# Patient Record
Sex: FEMALE | Race: BLACK OR AFRICAN AMERICAN | NOT HISPANIC OR LATINO | Employment: UNEMPLOYED | URBAN - METROPOLITAN AREA
[De-identification: names, ages, dates, MRNs, and addresses within clinical notes are randomized per-mention and may not be internally consistent; named-entity substitution may affect disease eponyms.]

---

## 2017-02-13 ENCOUNTER — HOSPITAL ENCOUNTER (OUTPATIENT)
Dept: RADIOLOGY | Facility: HOSPITAL | Age: 37
Discharge: HOME | End: 2017-02-13
Attending: OPHTHALMOLOGY
Payer: MEDICAID

## 2017-02-13 DIAGNOSIS — H53.469: ICD-10-CM

## 2017-02-13 PROCEDURE — 70553 MRI BRAIN STEM W/O & W/DYE: CPT | Mod: TC

## 2017-02-13 PROCEDURE — 25500020 PHARM REV CODE 255

## 2017-02-13 PROCEDURE — A9585 GADOBUTROL INJECTION: HCPCS

## 2017-02-13 PROCEDURE — 70553 MRI BRAIN STEM W/O & W/DYE: CPT | Mod: 26,,, | Performed by: RADIOLOGY

## 2017-02-13 RX ORDER — GADOBUTROL 604.72 MG/ML
8.5 INJECTION INTRAVENOUS
Status: COMPLETED | OUTPATIENT
Start: 2017-02-13 | End: 2017-02-13

## 2017-02-13 RX ADMIN — GADOBUTROL 8.5 ML: 604.72 INJECTION INTRAVENOUS at 04:02

## 2017-05-27 PROCEDURE — 99284 EMERGENCY DEPT VISIT MOD MDM: CPT

## 2017-05-28 ENCOUNTER — HOSPITAL ENCOUNTER (EMERGENCY)
Facility: HOSPITAL | Age: 37
Discharge: HOME | End: 2017-05-28
Attending: EMERGENCY MEDICINE
Payer: MEDICAID

## 2017-05-28 VITALS
BODY MASS INDEX: 32.27 KG/M2 | HEART RATE: 90 BPM | SYSTOLIC BLOOD PRESSURE: 128 MMHG | DIASTOLIC BLOOD PRESSURE: 74 MMHG | OXYGEN SATURATION: 100 % | WEIGHT: 189 LBS | HEIGHT: 64 IN | TEMPERATURE: 98 F | RESPIRATION RATE: 14 BRPM

## 2017-05-28 DIAGNOSIS — Z77.098 ACCIDENTAL EXPOSURE TO BLEACH: Primary | ICD-10-CM

## 2017-05-28 DIAGNOSIS — H10.211 CHEMICAL CONJUNCTIVITIS OF RIGHT EYE: ICD-10-CM

## 2017-05-28 PROCEDURE — 25000003 PHARM REV CODE 250: Performed by: EMERGENCY MEDICINE

## 2017-05-28 RX ORDER — TETRACAINE HYDROCHLORIDE 5 MG/ML
2 SOLUTION OPHTHALMIC
Status: COMPLETED | OUTPATIENT
Start: 2017-05-28 | End: 2017-05-28

## 2017-05-28 RX ADMIN — TETRACAINE HYDROCHLORIDE 2 DROP: 5 SOLUTION OPHTHALMIC at 12:05

## 2017-05-28 NOTE — ED TRIAGE NOTES
Pt presents to ER due to bleach in right eye. Pt states she was unscrewing a bleach bottle when it spilled into her right eye.Pt complains of R eye pain.Pt states she irrigated eye for 20 mins and EMS irrigated eye for 10.

## 2017-05-28 NOTE — ED PROVIDER NOTES
Encounter Date: 5/27/2017    SCRIBE #1 NOTE: I, Venkatesh Benoit, am scribing for, and in the presence of, Lenard King MD. Other sections scribed: HPI, ROS.       History     Chief Complaint   Patient presents with    Eye Pain     Pt presents to ER due to bleach in R eye. EMS states pt irrigated eye for 20 mins and EMS states they irrigated eye for 10 mins.      Review of patient's allergies indicates:  No Known Allergies  CC: Chemical Exposure, Eye Pain  HPI: This 36 y.o. female presents to the ED via EMS c/o severe R eye pain with associated redness and irritation s/p bleach splashing into her eye at home this evening. Pt explains that she was grasping a squirt bottle of bleach to pick it up when the top came off, causing droplets of bleach to fly into her eye. She reports washing her eye out at home with limited relief. She denies loss of vision in eye; she has no other complaints.      The history is provided by the patient.     Past Medical History:   Diagnosis Date    Normal vaginal delivery     x4     History reviewed. No pertinent surgical history.  History reviewed. No pertinent family history.  Social History   Substance Use Topics    Smoking status: Never Smoker    Smokeless tobacco: Never Used    Alcohol use No     Review of Systems   Constitutional: Negative for fever.   HENT: Negative for rhinorrhea and sore throat.    Eyes: Positive for pain (R) and redness (R).   Respiratory: Negative for cough and shortness of breath.    Cardiovascular: Negative for chest pain.   Skin: Negative for rash and wound.   Neurological: Negative for headaches.   All other systems reviewed and are negative.      Physical Exam     Initial Vitals [05/28/17 0005]   BP Pulse Resp Temp SpO2   132/80 110 14 98 °F (36.7 °C) 100 %     Physical Exam  Nursing note and vitals reviewed.  Constitutional: Nontoxic female who appears uncomfortable.  HENT:    Head: NC/AT    Eyes:     General appearance:  normal-appearing upper and  lower lids.          Conjunctivae:  Moderate chemosis noted to the right eye w/o discharge/exudate.  Conjunctiva appears injected.  (-) scleral icterus.     Pupils:  PERRL.      Visual fields:  Intact    Visual acuity:  See nursing communications             Mouth/Throat: MMM   Neck: Neck supple, normal rom.  Neurological: A&O x4.  No acute focal motor deficits.  Normal gait.  Skin: Skin is intact, warm and dry.  No rash.  Psychiatric: normal mood and affect.      ED Course   Procedures  Labs Reviewed - No data to display          Medical Decision Making:   History:   I obtained history from: someone other than patient.  Old Medical Records: I decided to obtain old medical records.  Old Records Summarized: other records.  Clinical Tests:   Lab Tests: Ordered and Reviewed    Differential Diagnosis:   Differential diagnosis includes but is not limited to... Conjunctivitis, corneal abrasion vs ulcer     Additional Medical Decision Makin-year-old female with no significant past medical history presents the emergency department complaining of right eye pain and redness s/p accidental bleach exposure - see hpi for additional details.  On exam, she has an injected right conjunctiva along with moderate chemosis.  Initial pH 9.   Eye irrigation (NS) initiated w/ Klever lens. Dispo pending...    2:20 AM - after having been irrigated with 2 L normal saline, repeat pH 7.5.  The remainder of her eye exam is unremarkable including negative fluorescein stain.  Findings at this time, most consistent with chemical conjunctivitis.  Recommend close follow-up with ophthalmology within 3-5 days for reevaluation further management.  Return instructions discussed prior to discharge.              Scribe Attestation:   Scribe #1: I performed the above scribed service and the documentation accurately describes the services I performed. I attest to the accuracy of the note.    Attending Attestation:           Physician Attestation for  Scribe:  Physician Attestation Statement for Scribe #1: I, Lenard King MD, reviewed documentation, as scribed by Venkatesh Laboy in my presence, and it is both accurate and complete.                 ED Course     Clinical Impression:   The primary encounter diagnosis was Accidental exposure to bleach. A diagnosis of Chemical conjunctivitis of right eye was also pertinent to this visit.    Disposition:   Disposition: Discharged       Lenard King MD  05/28/17 0323

## 2021-12-18 ENCOUNTER — HOSPITAL ENCOUNTER (EMERGENCY)
Facility: HOSPITAL | Age: 41
Discharge: HOME | End: 2021-12-18
Attending: EMERGENCY MEDICINE
Payer: MEDICAID

## 2021-12-18 VITALS
DIASTOLIC BLOOD PRESSURE: 82 MMHG | OXYGEN SATURATION: 100 % | HEIGHT: 64 IN | WEIGHT: 185 LBS | TEMPERATURE: 98 F | RESPIRATION RATE: 18 BRPM | BODY MASS INDEX: 31.58 KG/M2 | SYSTOLIC BLOOD PRESSURE: 142 MMHG | HEART RATE: 72 BPM

## 2021-12-18 DIAGNOSIS — Z71.89 EDUCATED ABOUT COVID-19 VIRUS INFECTION: Primary | ICD-10-CM

## 2021-12-18 LAB
B-HCG UR QL: NEGATIVE
CTP QC/QA: YES
POC MOLECULAR INFLUENZA A AGN: NEGATIVE
POC MOLECULAR INFLUENZA B AGN: NEGATIVE
SARS-COV-2 RDRP RESP QL NAA+PROBE: NEGATIVE

## 2021-12-18 PROCEDURE — 99282 EMERGENCY DEPT VISIT SF MDM: CPT | Mod: 25

## 2021-12-18 PROCEDURE — U0002 COVID-19 LAB TEST NON-CDC: HCPCS | Performed by: EMERGENCY MEDICINE

## 2021-12-18 PROCEDURE — 87502 INFLUENZA DNA AMP PROBE: CPT

## 2021-12-18 PROCEDURE — 81025 URINE PREGNANCY TEST: CPT | Performed by: EMERGENCY MEDICINE

## 2021-12-18 RX ORDER — ONDANSETRON 8 MG/1
8 TABLET, ORALLY DISINTEGRATING ORAL
Status: DISCONTINUED | OUTPATIENT
Start: 2021-12-18 | End: 2021-12-18

## 2022-08-24 ENCOUNTER — HOSPITAL ENCOUNTER (EMERGENCY)
Facility: HOSPITAL | Age: 42
Discharge: HOME | End: 2022-08-24
Attending: EMERGENCY MEDICINE
Payer: MEDICAID

## 2022-08-24 VITALS
TEMPERATURE: 98 F | WEIGHT: 175 LBS | OXYGEN SATURATION: 100 % | RESPIRATION RATE: 16 BRPM | BODY MASS INDEX: 29.88 KG/M2 | DIASTOLIC BLOOD PRESSURE: 69 MMHG | HEART RATE: 84 BPM | SYSTOLIC BLOOD PRESSURE: 125 MMHG | HEIGHT: 64 IN

## 2022-08-24 DIAGNOSIS — M25.511 ACUTE PAIN OF RIGHT SHOULDER: Primary | ICD-10-CM
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
## 2022-08-24 LAB
B-HCG UR QL: NEGATIVE
CTP QC/QA: YES

## 2022-08-24 PROCEDURE — 25000003 PHARM REV CODE 250: Performed by: PHYSICIAN ASSISTANT

## 2022-08-24 PROCEDURE — 81025 URINE PREGNANCY TEST: CPT | Performed by: EMERGENCY MEDICINE

## 2022-08-24 PROCEDURE — 99284 EMERGENCY DEPT VISIT MOD MDM: CPT

## 2022-08-24 RX ORDER — IBUPROFEN 600 MG/1
600 TABLET ORAL EVERY 6 HOURS PRN
Qty: 20 TABLET | Refills: 0 | Status: SHIPPED | OUTPATIENT
Start: 2022-08-24

## 2022-08-24 RX ORDER — NAPROXEN 500 MG/1
500 TABLET ORAL
Status: COMPLETED | OUTPATIENT
Start: 2022-08-24 | End: 2022-08-24

## 2022-08-24 RX ORDER — LIDOCAINE 50 MG/G
1 PATCH TOPICAL DAILY
Qty: 15 PATCH | Refills: 0 | Status: SHIPPED | OUTPATIENT
Start: 2022-08-24

## 2022-08-24 RX ADMIN — NAPROXEN 500 MG: 500 TABLET ORAL at 02:08

## 2022-08-24 NOTE — ED PROVIDER NOTES
"Encounter Date: 8/24/2022    SCRIBE #1 NOTE: I, Marilynn Bhagat, am scribing for, and in the presence of,  Alayna Holdsworth, PA. I have scribed the following portions of the note - Other sections scribed: HPI, SANDI.       History     Chief Complaint   Patient presents with    Fall     Pt c/o right shoulder pain s/p slip and  ground level fall 2 days ago. Denies hitting head or loc     Chase Correa is a 42 y.o. female, with no pertinent PMHx of who presents to the ED with fall onset 3 days ago. Patient reports she fell off of a 1 foot ramp while at work and she injured her right shoulder. Patient states the shoulder pain is intermittent and "throbbing" with a severity rate of 5/10. She notes the pain is exacerbated with lifting and when she lays on it. No other exacerbating or alleviating factors. Denies head injury, syncope, fever, diaphoresis, chills, shortness of breath, chest pain, nausea, vomiting, headache, difficulty urinating, diarrhea, constipation, dizziness, numbness, tingling, or other associated symptoms.     The history is provided by the patient.     Review of patient's allergies indicates:  No Known Allergies  Past Medical History:   Diagnosis Date    Normal vaginal delivery     x4     Past Surgical History:   Procedure Laterality Date    EYE SURGERY       History reviewed. No pertinent family history.  Social History     Tobacco Use    Smoking status: Never Smoker    Smokeless tobacco: Never Used   Substance Use Topics    Alcohol use: No    Drug use: No     Review of Systems   Constitutional: Negative for chills, diaphoresis and fever.        Positive for fall. Negative for head injury.    Eyes: Negative for visual disturbance.   Respiratory: Negative for shortness of breath.    Cardiovascular: Negative for chest pain.   Gastrointestinal: Negative for constipation, diarrhea, nausea and vomiting.   Genitourinary: Negative for decreased urine volume, difficulty urinating, dysuria, frequency " and urgency.   Musculoskeletal: Positive for arthralgias (Right shoulder pain). Negative for back pain and neck pain.   Skin: Negative for rash and wound.   Neurological: Negative for dizziness, syncope, numbness and headaches.        Negative for tingling.        Physical Exam     Initial Vitals [08/24/22 1355]   BP Pulse Resp Temp SpO2   125/69 84 16 98.3 °F (36.8 °C) 100 %      MAP       --         Physical Exam    Nursing note and vitals reviewed.  Constitutional: Vital signs are normal. She appears well-developed and well-nourished. She is not diaphoretic. She is active. She does not appear ill. No distress.   HENT:   Head: Normocephalic and atraumatic.   Right Ear: External ear normal.   Left Ear: External ear normal.   Nose: Nose normal.   Eyes: Conjunctivae, EOM and lids are normal. Pupils are equal, round, and reactive to light. Right eye exhibits no discharge. Left eye exhibits no discharge.   Neck: Neck supple.   Normal range of motion.   Full passive range of motion without pain.     Cardiovascular: Normal rate and regular rhythm.   Pulses:       Radial pulses are 2+ on the right side and 2+ on the left side.   Pulmonary/Chest: Effort normal and breath sounds normal. No respiratory distress.   Abdominal: She exhibits no distension.   Musculoskeletal:         General: Normal range of motion.      Right shoulder: Bony tenderness (anterior) present. No swelling, deformity, effusion or laceration. Normal range of motion. Normal strength.      Left shoulder: Normal.      Right upper arm: Normal.      Left upper arm: Normal.      Right elbow: Normal.      Left elbow: Normal.      Right forearm: Normal.      Left forearm: Normal.      Right wrist: Normal.      Left wrist: Normal.      Left hand: Normal.      Cervical back: Full passive range of motion without pain, normal range of motion and neck supple. No spinous process tenderness.      Comments: Painful ROM of right shoulder. 2+ radial pulses. Normal  sensation and strength. No deformities noted.      Neurological: She is alert and oriented to person, place, and time. She has normal strength. No sensory deficit. GCS eye subscore is 4. GCS verbal subscore is 5. GCS motor subscore is 6.   Skin: Skin is dry. Capillary refill takes less than 2 seconds.         ED Course   Procedures  Labs Reviewed   POCT URINE PREGNANCY          Imaging Results          X-Ray Shoulder Trauma Right (Final result)  Result time 08/24/22 15:21:02    Final result by Jamari Escobar MD (08/24/22 15:21:02)                 Impression:      No acute displaced fracture-dislocation identified.      Electronically signed by: Jamari Escobar MD  Date:    08/24/2022  Time:    15:21             Narrative:    EXAMINATION:  XR SHOULDER TRAUMA 3 VIEW RIGHT    CLINICAL HISTORY:  Unspecified fall, initial encounter    TECHNIQUE:  Three or four views of the right shoulder were performed.    COMPARISON:  Right shoulder series 08/03/2009    FINDINGS:  Bones are well mineralized. Overall alignment is within normal limits. No displaced fracture, dislocation or destructive osseous process. Joint spaces appear relatively maintained. No subcutaneous emphysema or radiodense retained foreign body.  Right lung apex is clear.                                 Medications   naproxen tablet 500 mg (500 mg Oral Given 8/24/22 1434)     Medical Decision Making:   History:   Old Medical Records: I decided to obtain old medical records.  Initial Assessment:   42 y.o. female, with no pertinent PMHx of who presents to the ED with fall.  Patient's chart and medical history reviewed.  Differential Diagnosis:   Shoulder fracture  Shoulder dislocation  Shoulder sprain  Shoulder contusion  Clinical Tests:   Radiological Study: Ordered and Reviewed  ED Management:  Patient's vitals reviewed.  She is afebrile, no respiratory distress, nontoxic-appearing in the ED. Patient had TTP of right anterior shoulder and painful ROM of right  shoulder. 2+ radial pulses. Normal sensation and strength. No deformities noted.  Patient given naproxen for pain.  X-ray showed no fractures or dislocations.  Discussed with patient this is most likely bone contusion from the fall which will take time to heal. Instructed patient to rest, ice, elevate, and use ibuprofen and tylenol as needed for pain.  Patient will be sent home with lidocaine patches and Motrin for symptomatic control. Discussed with her that she must leave patch on for 12 hours then leave off for 12 hours, she verbalized understanding. Patient agrees with this plan. Discussed with her strict return precautions, she verbalized understanding. Patient is stable for discharge.           Scribe Attestation:   Scribe #1: I performed the above scribed service and the documentation accurately describes the services I performed. I attest to the accuracy of the note.                 Clinical Impression:   Final diagnoses:  [W19.XXXA] Fall  [M25.511] Acute pain of right shoulder (Primary)       I, Alayna Holdsworth,PA-C, personally performed the services described in this documentation. All medical record entries made by the scribe were at my direction and in my presence. I have reviewed the chart and agree that the record reflects my personal performance and is accurate and complete.   ED Disposition Condition    Discharge Stable        ED Prescriptions     Medication Sig Dispense Start Date End Date Auth. Provider    ibuprofen (ADVIL,MOTRIN) 600 MG tablet Take 1 tablet (600 mg total) by mouth every 6 (six) hours as needed for Pain. 20 tablet 8/24/2022  Alayna Holdsworth, PA-C    LIDOcaine (LIDODERM) 5 % Place 1 patch onto the skin once daily. Remove & Discard patch within 12 hours then leave off for 12 hours 15 patch 8/24/2022  Alayna Holdsworth, PA-C        Follow-up Information     Follow up With Specialties Details Why Contact Info    Pineda Osborn MD Family Medicine   [address] [facility] [address] [facility]  [facility] [address]  [phone]             Alayna Holdsworth, PA-C  08/24/22 4594

## 2022-08-24 NOTE — FIRST PROVIDER EVALUATION
Emergency Department TeleTriage Encounter Note      CHIEF COMPLAINT    Chief Complaint   Patient presents with    Fall     Pt c/o right shoulder pain s/p slip and  ground level fall 2 days ago. Denies hitting head or loc       VITAL SIGNS   Initial Vitals [08/24/22 1355]   BP Pulse Resp Temp SpO2   125/69 84 16 98.3 °F (36.8 °C) 100 %      MAP       --            ALLERGIES    Review of patient's allergies indicates:  No Known Allergies    PROVIDER TRIAGE NOTE  This is a teletriage evaluation of a 42 y.o. female presenting to the ED with c/o right shoulder pain after slip and fall two days ago, no head trauma or other MSK pain. Not improved with excedrin at home.     PE: limited ROM R shoulder. Non-toxic/well-appearing. No respiratory distress, speaks in full sentences without issue. No active emesis nor cough. Normal eye contact and mentation.     Plan: XR, meds. Further/augmented workup at discretion of examining provider.     All ED beds are full at present; patient notified of this status.  Patient seen and medically screened by MARCELA via teletriage. Orders initiated at triage to expedite care.  Patient is stable and will be placed in an ED bed when available.  Care will be transferred to an alternate provider when patient has been placed in an Exam Room further exam, additional orders, and disposition.         ORDERS  Labs Reviewed   POCT URINE PREGNANCY       ED Orders (720h ago, onward)    Start Ordered     Status Ordering Provider    08/24/22 1415 08/24/22 1414  naproxen tablet 500 mg  ED 1 Time         Ordered SANKET MEZA    08/24/22 1414 08/24/22 1414  X-Ray Shoulder Trauma Right  1 time imaging         Ordered SANKET MEZA    08/24/22 1358 08/24/22 1357  POCT urine pregnancy  Once         Ordered DERRICK OTERO            Virtual Visit Note: The provider triage portion of this emergency department evaluation and documentation was performed via [employer], a HIPAA-compliant telemedicine  application, in concert with a tele-presenter in the room. A face to face patient evaluation with one of my colleagues will occur once the patient is placed in an emergency department room.      DISCLAIMER: This note was prepared with [employer] voice recognition transcription software. Garbled syntax, mangled pronouns, and other bizarre constructions may be attributed to that software system.

## 2022-08-24 NOTE — Clinical Note
"Chase Hernandez" Sunny was seen and treated in our emergency department on 8/24/2022.  She may return with limitations on 08/25/2022.  No heavy lifting for the next week.     Sincerely,      Alayna Holdsworth, PA-C    "

## 2022-08-24 NOTE — DISCHARGE INSTRUCTIONS
Thank you for coming to our Emergency Department today. It is important to remember that some problems are difficult to diagnose and may not be found during your first visit. Be sure to follow up with your primary care doctor and review any labs/imaging that was performed with them. If you do not have a primary care doctor, you may contact the one listed on your discharge paperwork or you may also call the [facility] Appointment Desk at [phone] to schedule an appointment with one.     All medications may potentially have side effects and it is impossible to predict which medications may give you side effects. If you feel that you are having a negative effect of any medication you should immediately stop taking them and seek medical attention.    Return to the ER with any questions/concerns, new/concerning symptoms, worsening or failure to improve. Do not drive or make any important decisions for 24 hours if you have received any pain medications, sedatives or mood altering drugs during your ER visit.

## 2022-08-24 NOTE — ED TRIAGE NOTES
"Pt. Reports she was at work and accidentally fell off the " ramp". Pt. reports she was pushing furniture off the truck an lost her balance. Pt. reports she fell on the right side and has right side shoulder/ upper arm pain. Pt. reports incident happen 2 days ago.   "

## 2023-09-29 ENCOUNTER — HOSPITAL ENCOUNTER (EMERGENCY)
Facility: HOSPITAL | Age: 43
Discharge: HOME | End: 2023-09-29
Attending: EMERGENCY MEDICINE
Payer: MEDICAID

## 2023-09-29 VITALS
SYSTOLIC BLOOD PRESSURE: 148 MMHG | BODY MASS INDEX: 34.55 KG/M2 | TEMPERATURE: 98 F | RESPIRATION RATE: 18 BRPM | HEART RATE: 84 BPM | DIASTOLIC BLOOD PRESSURE: 76 MMHG | WEIGHT: 195 LBS | HEIGHT: 63 IN | OXYGEN SATURATION: 100 %

## 2023-09-29 DIAGNOSIS — M65.4 DE QUERVAIN'S TENOSYNOVITIS, LEFT: Primary | ICD-10-CM

## 2023-09-29 PROCEDURE — 99283 EMERGENCY DEPT VISIT LOW MDM: CPT

## 2023-09-29 RX ORDER — DICLOFENAC SODIUM 10 MG/G
2 GEL TOPICAL 3 TIMES DAILY
Qty: 100 G | Refills: 0 | Status: SHIPPED | OUTPATIENT
Start: 2023-09-29 | End: 2023-10-09

## 2023-09-29 RX ORDER — MELOXICAM 7.5 MG/1
7.5 TABLET ORAL DAILY
Qty: 12 TABLET | Refills: 0 | Status: SHIPPED | OUTPATIENT
Start: 2023-09-29

## 2023-09-29 NOTE — ED PROVIDER NOTES
Encounter Date: 9/29/2023    SCRIBE #1 NOTE: I, Faith Madrid, am scribing for, and in the presence of,  Alex David PA-C. I have scribed the following portions of the note - Other sections scribed: HPI, ROS.       History     Chief Complaint   Patient presents with    Hand Pain     LEFT thumb that radiates to wrist. Denies injury/trauma. States may have picked up something wrong at work. No obvious deformity. No tx used.     This is a 43 year old female with no pertinent PMHx who presents to the ED complaining of Radiating Left Hand Pain, yesterday. Patient endorses her left thumb pain radiates to her left wrist. Patient also endorses no medication prior to arrival. 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 No other alleviating or exacerbating factors. Patient denies Hx of Gout. Patient also denies trauma/injury. Patient denies fever or other associated symptoms. This is the extent of the patient's complaints in the ED. NKDA.       The history is provided by the patient. No  was used.     Review of patient's allergies indicates:  No Known Allergies  Past Medical History:   Diagnosis Date    Normal vaginal delivery     x4     Past Surgical History:   Procedure Laterality Date    EYE SURGERY       History reviewed. No pertinent family history.  Social History     Tobacco Use    Smoking status: Never    Smokeless tobacco: Never   Substance Use Topics    Alcohol use: No    Drug use: No     Review of Systems   Constitutional:  Negative for fever.   HENT:  Negative for trouble swallowing.    Eyes:  Negative for visual disturbance.   Respiratory:  Negative for cough and shortness of breath.    Cardiovascular:  Negative for chest pain.   Gastrointestinal:  Negative for abdominal pain.   Genitourinary:  Negative for difficulty urinating.   Musculoskeletal:  Negative for joint swelling.        (+) Let Hand Pain   (+) Left Wrist Pain   Skin:  Negative for  color change.   Neurological:  Negative for dizziness and headaches.        (-) trauma/injury   All other systems reviewed and are negative.          Physical Exam     Initial Vitals [09/29/23 1300]   BP Pulse Resp Temp SpO2   (!) 148/76 84 18 97.9 °F (36.6 °C) 100 %      MAP       --         Physical Exam    Nursing note and vitals reviewed.  Constitutional: She appears well-developed and well-nourished. She is not diaphoretic. No distress.   HENT:   Head: Atraumatic.   Right Ear: External ear normal.   Left Ear: External ear normal.   Eyes: Conjunctivae and EOM are normal.   Neck: No tracheal deviation present.   Normal range of motion.  Cardiovascular:  Normal rate and regular rhythm.           Pulmonary/Chest: No accessory muscle usage or stridor. No tachypnea. No respiratory distress.   Musculoskeletal:         General: No edema. Normal range of motion.      Cervical back: Normal range of motion.      Comments: Positive Finkelstein's on the left without gross deformity, erythema, or snuffbox tenderness.  Capillary refill less than 2 seconds.  Full ROM of digit.     Neurological: She is alert and oriented to person, place, and time. She displays no tremor. She displays no seizure activity. Coordination and gait normal.   Skin: Skin is intact. Capillary refill takes less than 2 seconds. No rash noted. No erythema.         ED Course   Procedures  Labs Reviewed - No data to display       Imaging Results    None          Medications - No data to display  Medical Decision Making  De Quervain tenosynovitis.  Atraumatic.  No ischemic digit or infectious process.    Risk  Prescription drug management.            Scribe Attestation:   Scribe #1: I performed the above scribed service and the documentation accurately describes the services I performed. I attest to the accuracy of the note.                 I, Alex David PA-C, personally performed the services described in this documentation. All medical record entries  made by the scribe were at my direction and in my presence. I have reviewed the chart and agree that the record reflects my personal performance and is accurate and complete.        Clinical Impression:   Final diagnoses:  [M65.4] De Quervain's tenosynovitis, left (Primary)        ED Disposition Condition    Discharge Stable          ED Prescriptions       Medication Sig Dispense Start Date End Date Auth. Provider    meloxicam (MOBIC) 7.5 MG tablet Take 1 tablet (7.5 mg total) by mouth once daily. 12 tablet 9/29/2023 -- Alex David PA-C    diclofenac sodium (VOLTAREN) 1 % Gel Apply 2 g topically 3 (three) times daily. for 10 days 100 g 9/29/2023 10/9/2023 Alex David PA-C          Follow-up Information       Follow up With Specialties Details Why Contact Info    Pineda Osborn MD Family Medicine Schedule an appointment as soon as possible for a visit in 3 days For re-evaluation [address] [facility] [address]  [phone]      [facility] [place] - Emergency Dept Emergency Medicine Go to  If symptoms worsen or new symptoms develop [id number]  [facility] [id number] [phone]             Alex David PA-C  09/29/23 1447

## 2023-09-29 NOTE — Clinical Note
"Chase Hernandez" Sunny was seen and treated in our emergency department on 9/29/2023.  She may return to work on 10/03/2023.       If you have any questions or concerns, please don't hesitate to call.      Alex David PA-C"

## 2023-09-29 NOTE — DISCHARGE INSTRUCTIONS
Thank you for coming to our Emergency Department today. It is important to remember that some problems or medical conditions are difficult to diagnose and may not be found or addressed during your Emergency Department visit.  These conditions often start with non-specific symptoms and can only be diagnosed on follow up visits with your primary care physician or specialist when the symptoms continue or change. Please remember that all medical conditions can change, and we cannot predict how you will be feeling tomorrow or the next day. Return to the ER with any questions/concerns, new/concerning symptoms, worsening or failure to improve.       Be sure to follow up with your primary care doctor and review all labs/imaging/tests that were performed during your ER visit with them. It is very common for us to identify non-emergent incidental findings which must be followed up with your primary care physician.  Some labs/imaging/tests may be outside of the normal range, and require non-emergent follow-up and/or further investigation/treatment/procedures/testing to help diagnose/exclude/prevent complications or other potentially serious medical conditions. Some abnormalities may not have been discussed or addressed during your ER visit.     An ER visit does not replace a primary care visit, and many screening tests or follow-up tests cannot be ordered by an ER doctor or performed by the ER. Some tests may even require pre-approval.    If you do not have a primary care doctor, you may contact the one listed on your discharge paperwork or you may also call the [facility] Appointment Desk at [phone] , or [address] [place] [address] at  [phone] to schedule an appointment, or establish care with a primary care doctor or even a specialist and to obtain information about local resources. It is important to your health that you have a primary care doctor.    Please take all medications as directed. We have done our best to select  a medication for you that will treat your condition however, all medications may potentially have side-effects and it is impossible to predict which medications may give you side-effects or what those side-effects (if any) those medications may give you.  If you feel that you are having a negative effect or side-effect of any medication you should stop taking those medications immediately and seek medical attention. If you feel that you are having a life-threatening reaction call 911.        Do not drive, swim, climb to height, take a bath, operate heavy machinery, drink alcohol or take potentially sedating medications, sign any legal documents or make any important decisions for 24 hours if you have received any pain medications, sedatives or mood altering drugs during your ER visit or within 24 hours of taking them if they have been prescribed to you.     You can find additional resources for Dentists, hearing aids, durable medical equipment, low cost pharmacies and other resources at [email or web address]